# Patient Record
Sex: MALE | Race: WHITE | Employment: UNEMPLOYED | URBAN - NONMETROPOLITAN AREA
[De-identification: names, ages, dates, MRNs, and addresses within clinical notes are randomized per-mention and may not be internally consistent; named-entity substitution may affect disease eponyms.]

---

## 2017-07-02 ENCOUNTER — HOSPITAL ENCOUNTER (EMERGENCY)
Age: 27
Discharge: HOME | End: 2017-07-02
Attending: EMERGENCY MEDICINE
Payer: COMMERCIAL

## 2017-07-02 ENCOUNTER — APPOINTMENT (OUTPATIENT)
Dept: CT IMAGING | Age: 27
End: 2017-07-02
Payer: COMMERCIAL

## 2017-07-02 VITALS
RESPIRATION RATE: 17 BRPM | OXYGEN SATURATION: 96 % | TEMPERATURE: 99 F | HEART RATE: 103 BPM | SYSTOLIC BLOOD PRESSURE: 137 MMHG | DIASTOLIC BLOOD PRESSURE: 96 MMHG

## 2017-07-02 DIAGNOSIS — R10.31 ABDOMINAL PAIN, RIGHT LOWER QUADRANT: Primary | ICD-10-CM

## 2017-07-02 DIAGNOSIS — R19.00 PELVIC MASS IN MALE: ICD-10-CM

## 2017-07-02 LAB
-: NORMAL
ABSOLUTE EOS #: 0.1 K/UL (ref 0–0.4)
ABSOLUTE LYMPH #: 1.8 K/UL (ref 1–4.8)
ABSOLUTE MONO #: 0.7 K/UL (ref 0–1)
ALBUMIN SERPL-MCNC: 4.9 G/DL (ref 3.5–5.2)
ALBUMIN/GLOBULIN RATIO: 1.6 (ref 1–2.5)
ALP BLD-CCNC: 67 U/L (ref 40–129)
ALT SERPL-CCNC: 57 U/L (ref 5–41)
AMORPHOUS: NORMAL
ANION GAP SERPL CALCULATED.3IONS-SCNC: 15 MMOL/L (ref 9–17)
AST SERPL-CCNC: 34 U/L
BACTERIA: NORMAL
BASOPHILS # BLD: 0 %
BASOPHILS ABSOLUTE: 0 K/UL (ref 0–0.2)
BILIRUB SERPL-MCNC: 0.23 MG/DL (ref 0.3–1.2)
BILIRUBIN URINE: NEGATIVE
BUN BLDV-MCNC: 12 MG/DL (ref 6–20)
BUN/CREAT BLD: 18 (ref 9–20)
CALCIUM SERPL-MCNC: 9.6 MG/DL (ref 8.6–10.4)
CASTS UA: NORMAL /LPF
CHLORIDE BLD-SCNC: 100 MMOL/L (ref 98–107)
CO2: 26 MMOL/L (ref 20–31)
COLOR: YELLOW
COMMENT UA: ABNORMAL
CREAT SERPL-MCNC: 0.65 MG/DL (ref 0.7–1.2)
CRYSTALS, UA: NORMAL /HPF
DIFFERENTIAL TYPE: NORMAL
EOSINOPHILS RELATIVE PERCENT: 1 %
EPITHELIAL CELLS UA: NORMAL /HPF (ref 0–5)
GFR AFRICAN AMERICAN: >60 ML/MIN
GFR NON-AFRICAN AMERICAN: >60 ML/MIN
GFR SERPL CREATININE-BSD FRML MDRD: ABNORMAL ML/MIN/{1.73_M2}
GFR SERPL CREATININE-BSD FRML MDRD: ABNORMAL ML/MIN/{1.73_M2}
GLUCOSE BLD-MCNC: 98 MG/DL (ref 70–99)
GLUCOSE URINE: NEGATIVE
HCT VFR BLD CALC: 44.5 % (ref 41–53)
HEMOGLOBIN: 15 G/DL (ref 13.5–17)
KETONES, URINE: NEGATIVE
LEUKOCYTE ESTERASE, URINE: NEGATIVE
LIPASE: 20 U/L (ref 13–60)
LYMPHOCYTES # BLD: 20 %
MCH RBC QN AUTO: 29.4 PG (ref 26–34)
MCHC RBC AUTO-ENTMCNC: 33.7 G/DL (ref 31–37)
MCV RBC AUTO: 87.2 FL (ref 80–100)
MONOCYTES # BLD: 8 %
MUCUS: NORMAL
NITRITE, URINE: NEGATIVE
OTHER OBSERVATIONS UA: NORMAL
PDW BLD-RTO: 14.3 % (ref 12.1–15.2)
PH UA: 6 (ref 5–9)
PLATELET # BLD: 413 K/UL (ref 140–450)
PLATELET ESTIMATE: NORMAL
PMV BLD AUTO: NORMAL FL (ref 6–12)
POTASSIUM SERPL-SCNC: 3.7 MMOL/L (ref 3.7–5.3)
PROTEIN UA: NEGATIVE
RBC # BLD: 5.11 M/UL (ref 4.5–5.9)
RBC # BLD: NORMAL 10*6/UL
RBC UA: NORMAL /HPF (ref 0–2)
RENAL EPITHELIAL, UA: NORMAL /HPF
SEG NEUTROPHILS: 71 %
SEGMENTED NEUTROPHILS ABSOLUTE COUNT: 6.1 K/UL (ref 1.8–7.7)
SODIUM BLD-SCNC: 141 MMOL/L (ref 135–144)
SPECIFIC GRAVITY UA: <1.005 (ref 1.01–1.02)
TOTAL PROTEIN: 8 G/DL (ref 6.4–8.3)
TRICHOMONAS: NORMAL
TURBIDITY: CLEAR
URINE HGB: NEGATIVE
UROBILINOGEN, URINE: NORMAL
WBC # BLD: 8.8 K/UL (ref 3.5–11)
WBC # BLD: NORMAL 10*3/UL
WBC UA: NORMAL /HPF (ref 0–5)
YEAST: NORMAL

## 2017-07-02 PROCEDURE — 74176 CT ABD & PELVIS W/O CONTRAST: CPT

## 2017-07-02 PROCEDURE — 81001 URINALYSIS AUTO W/SCOPE: CPT

## 2017-07-02 PROCEDURE — 83690 ASSAY OF LIPASE: CPT

## 2017-07-02 PROCEDURE — 96375 TX/PRO/DX INJ NEW DRUG ADDON: CPT

## 2017-07-02 PROCEDURE — 85025 COMPLETE CBC W/AUTO DIFF WBC: CPT

## 2017-07-02 PROCEDURE — 96374 THER/PROPH/DIAG INJ IV PUSH: CPT

## 2017-07-02 PROCEDURE — 36415 COLL VENOUS BLD VENIPUNCTURE: CPT

## 2017-07-02 PROCEDURE — 6360000002 HC RX W HCPCS: Performed by: EMERGENCY MEDICINE

## 2017-07-02 PROCEDURE — 80053 COMPREHEN METABOLIC PANEL: CPT

## 2017-07-02 PROCEDURE — 99284 EMERGENCY DEPT VISIT MOD MDM: CPT

## 2017-07-02 PROCEDURE — 2580000003 HC RX 258: Performed by: EMERGENCY MEDICINE

## 2017-07-02 PROCEDURE — 96361 HYDRATE IV INFUSION ADD-ON: CPT

## 2017-07-02 RX ORDER — ONDANSETRON 2 MG/ML
4 INJECTION INTRAMUSCULAR; INTRAVENOUS ONCE
Status: COMPLETED | OUTPATIENT
Start: 2017-07-02 | End: 2017-07-02

## 2017-07-02 RX ORDER — MORPHINE SULFATE 4 MG/ML
4 INJECTION, SOLUTION INTRAMUSCULAR; INTRAVENOUS ONCE
Status: COMPLETED | OUTPATIENT
Start: 2017-07-02 | End: 2017-07-02

## 2017-07-02 RX ADMIN — SODIUM CHLORIDE 999 ML: 9 INJECTION, SOLUTION INTRAVENOUS at 19:59

## 2017-07-02 RX ADMIN — MORPHINE SULFATE 4 MG: 4 INJECTION, SOLUTION INTRAMUSCULAR; INTRAVENOUS at 19:59

## 2017-07-02 RX ADMIN — ONDANSETRON 4 MG: 2 INJECTION INTRAMUSCULAR; INTRAVENOUS at 19:59

## 2017-07-02 ASSESSMENT — PAIN DESCRIPTION - DESCRIPTORS: DESCRIPTORS: CRAMPING

## 2017-07-02 ASSESSMENT — ENCOUNTER SYMPTOMS
ALLERGIC/IMMUNOLOGIC NEGATIVE: 1
RESPIRATORY NEGATIVE: 1
EYES NEGATIVE: 1
GASTROINTESTINAL NEGATIVE: 1

## 2017-07-02 ASSESSMENT — PAIN SCALES - GENERAL
PAINLEVEL_OUTOF10: 4
PAINLEVEL_OUTOF10: 4

## 2017-07-02 ASSESSMENT — PAIN DESCRIPTION - LOCATION: LOCATION: ABDOMEN

## 2017-07-02 ASSESSMENT — PAIN DESCRIPTION - PAIN TYPE: TYPE: ACUTE PAIN

## 2017-07-02 ASSESSMENT — PAIN DESCRIPTION - FREQUENCY: FREQUENCY: INTERMITTENT

## 2017-07-02 ASSESSMENT — PAIN DESCRIPTION - ORIENTATION: ORIENTATION: RIGHT;LOWER

## 2017-08-06 ENCOUNTER — APPOINTMENT (OUTPATIENT)
Dept: GENERAL RADIOLOGY | Age: 27
End: 2017-08-06
Payer: COMMERCIAL

## 2017-08-06 ENCOUNTER — HOSPITAL ENCOUNTER (EMERGENCY)
Age: 27
Discharge: HOME | End: 2017-08-06
Payer: COMMERCIAL

## 2017-08-06 VITALS
RESPIRATION RATE: 18 BRPM | SYSTOLIC BLOOD PRESSURE: 139 MMHG | DIASTOLIC BLOOD PRESSURE: 83 MMHG | OXYGEN SATURATION: 98 % | HEART RATE: 91 BPM | TEMPERATURE: 98.7 F

## 2017-08-06 DIAGNOSIS — S63.502A SPRAIN OF WRIST, LEFT, INITIAL ENCOUNTER: Primary | ICD-10-CM

## 2017-08-06 PROCEDURE — 73130 X-RAY EXAM OF HAND: CPT

## 2017-08-06 PROCEDURE — 73110 X-RAY EXAM OF WRIST: CPT

## 2017-08-06 PROCEDURE — 99283 EMERGENCY DEPT VISIT LOW MDM: CPT

## 2017-08-06 RX ORDER — IBUPROFEN 400 MG/1
400 TABLET ORAL EVERY 6 HOURS PRN
COMMUNITY

## 2017-08-06 RX ORDER — ACETAMINOPHEN 500 MG
1000 TABLET ORAL EVERY 6 HOURS PRN
COMMUNITY

## 2017-08-06 ASSESSMENT — PAIN DESCRIPTION - ORIENTATION: ORIENTATION: LEFT

## 2017-08-06 ASSESSMENT — ENCOUNTER SYMPTOMS
WHEEZING: 0
BACK PAIN: 0
COUGH: 0
NAUSEA: 0
ABDOMINAL PAIN: 0
EYE PAIN: 0
EYE DISCHARGE: 0
SINUS PRESSURE: 0
DIARRHEA: 0
VOMITING: 0
TROUBLE SWALLOWING: 0
RHINORRHEA: 0

## 2017-08-06 ASSESSMENT — PAIN DESCRIPTION - LOCATION: LOCATION: HAND;WRIST

## 2017-08-06 ASSESSMENT — PAIN DESCRIPTION - PAIN TYPE: TYPE: ACUTE PAIN

## 2017-08-06 ASSESSMENT — PAIN SCALES - GENERAL: PAINLEVEL_OUTOF10: 2

## 2017-09-01 ENCOUNTER — APPOINTMENT (OUTPATIENT)
Dept: GENERAL RADIOLOGY | Age: 27
End: 2017-09-01
Payer: COMMERCIAL

## 2017-09-01 ENCOUNTER — HOSPITAL ENCOUNTER (EMERGENCY)
Age: 27
Discharge: HOME | End: 2017-09-01
Payer: COMMERCIAL

## 2017-09-01 VITALS
HEART RATE: 104 BPM | DIASTOLIC BLOOD PRESSURE: 90 MMHG | SYSTOLIC BLOOD PRESSURE: 150 MMHG | RESPIRATION RATE: 18 BRPM | TEMPERATURE: 98.3 F | OXYGEN SATURATION: 98 % | HEIGHT: 69 IN

## 2017-09-01 DIAGNOSIS — S89.92XA LEG INJURY, LEFT, INITIAL ENCOUNTER: Primary | ICD-10-CM

## 2017-09-01 PROCEDURE — 73630 X-RAY EXAM OF FOOT: CPT

## 2017-09-01 PROCEDURE — 99283 EMERGENCY DEPT VISIT LOW MDM: CPT

## 2017-09-01 PROCEDURE — 73610 X-RAY EXAM OF ANKLE: CPT

## 2017-09-01 PROCEDURE — 73590 X-RAY EXAM OF LOWER LEG: CPT

## 2017-09-01 ASSESSMENT — ENCOUNTER SYMPTOMS
CONSTIPATION: 0
VOMITING: 0
ABDOMINAL PAIN: 0
NAUSEA: 0
RHINORRHEA: 0
SHORTNESS OF BREATH: 0
EYE REDNESS: 0
SORE THROAT: 0
WHEEZING: 0
BLOOD IN STOOL: 0
EYE DISCHARGE: 0
CHEST TIGHTNESS: 0
COUGH: 0
DIARRHEA: 0
BACK PAIN: 0

## 2017-09-01 ASSESSMENT — PAIN DESCRIPTION - PAIN TYPE: TYPE: ACUTE PAIN

## 2017-09-01 ASSESSMENT — PAIN SCALES - GENERAL: PAINLEVEL_OUTOF10: 4

## 2017-09-01 ASSESSMENT — PAIN DESCRIPTION - LOCATION: LOCATION: FOOT

## 2017-09-01 ASSESSMENT — PAIN DESCRIPTION - FREQUENCY: FREQUENCY: INTERMITTENT

## 2018-04-19 ENCOUNTER — HOSPITAL ENCOUNTER (EMERGENCY)
Age: 28
Discharge: HOME | End: 2018-04-19
Attending: EMERGENCY MEDICINE
Payer: COMMERCIAL

## 2018-04-19 ENCOUNTER — APPOINTMENT (OUTPATIENT)
Dept: CT IMAGING | Age: 28
End: 2018-04-19
Payer: COMMERCIAL

## 2018-04-19 ENCOUNTER — APPOINTMENT (OUTPATIENT)
Dept: GENERAL RADIOLOGY | Age: 28
End: 2018-04-19
Payer: COMMERCIAL

## 2018-04-19 VITALS
RESPIRATION RATE: 20 BRPM | TEMPERATURE: 98.8 F | BODY MASS INDEX: 21.15 KG/M2 | SYSTOLIC BLOOD PRESSURE: 141 MMHG | OXYGEN SATURATION: 100 % | HEART RATE: 108 BPM | DIASTOLIC BLOOD PRESSURE: 88 MMHG | WEIGHT: 143.2 LBS

## 2018-04-19 DIAGNOSIS — S16.1XXA STRAIN OF NECK MUSCLE, INITIAL ENCOUNTER: ICD-10-CM

## 2018-04-19 DIAGNOSIS — S09.90XA INJURY OF HEAD, INITIAL ENCOUNTER: Primary | ICD-10-CM

## 2018-04-19 PROCEDURE — 71046 X-RAY EXAM CHEST 2 VIEWS: CPT

## 2018-04-19 PROCEDURE — 70450 CT HEAD/BRAIN W/O DYE: CPT

## 2018-04-19 PROCEDURE — 72125 CT NECK SPINE W/O DYE: CPT

## 2018-04-19 PROCEDURE — 99283 EMERGENCY DEPT VISIT LOW MDM: CPT

## 2018-04-19 ASSESSMENT — PAIN DESCRIPTION - ONSET: ONSET: ON-GOING

## 2018-04-19 ASSESSMENT — PAIN DESCRIPTION - FREQUENCY: FREQUENCY: CONTINUOUS

## 2018-04-19 ASSESSMENT — PAIN DESCRIPTION - PROGRESSION: CLINICAL_PROGRESSION: GRADUALLY WORSENING

## 2018-04-19 ASSESSMENT — PAIN DESCRIPTION - DESCRIPTORS: DESCRIPTORS: CONSTANT

## 2018-04-19 ASSESSMENT — PAIN DESCRIPTION - LOCATION: LOCATION: BACK

## 2018-04-19 ASSESSMENT — PAIN SCALES - GENERAL: PAINLEVEL_OUTOF10: 1

## 2018-04-19 ASSESSMENT — PAIN DESCRIPTION - ORIENTATION: ORIENTATION: LEFT;RIGHT;UPPER

## 2018-04-19 ASSESSMENT — PAIN DESCRIPTION - PAIN TYPE: TYPE: ACUTE PAIN

## 2018-05-27 ENCOUNTER — APPOINTMENT (OUTPATIENT)
Dept: GENERAL RADIOLOGY | Age: 28
End: 2018-05-27
Payer: COMMERCIAL

## 2018-05-27 ENCOUNTER — HOSPITAL ENCOUNTER (EMERGENCY)
Age: 28
Discharge: HOME | End: 2018-05-27
Attending: EMERGENCY MEDICINE
Payer: COMMERCIAL

## 2018-05-27 VITALS
WEIGHT: 143 LBS | RESPIRATION RATE: 20 BRPM | DIASTOLIC BLOOD PRESSURE: 82 MMHG | OXYGEN SATURATION: 97 % | HEART RATE: 92 BPM | SYSTOLIC BLOOD PRESSURE: 117 MMHG | HEIGHT: 69 IN | BODY MASS INDEX: 21.18 KG/M2 | TEMPERATURE: 99.2 F

## 2018-05-27 DIAGNOSIS — S70.01XA CONTUSION OF RIGHT HIP, INITIAL ENCOUNTER: Primary | ICD-10-CM

## 2018-05-27 PROCEDURE — 72170 X-RAY EXAM OF PELVIS: CPT

## 2018-05-27 PROCEDURE — 99284 EMERGENCY DEPT VISIT MOD MDM: CPT

## 2018-05-27 PROCEDURE — 73502 X-RAY EXAM HIP UNI 2-3 VIEWS: CPT

## 2018-05-27 PROCEDURE — 6370000000 HC RX 637 (ALT 250 FOR IP): Performed by: EMERGENCY MEDICINE

## 2018-05-27 RX ORDER — DIPHENHYDRAMINE HCL 25 MG
25 CAPSULE ORAL EVERY 6 HOURS PRN
COMMUNITY

## 2018-05-27 RX ORDER — IBUPROFEN 800 MG/1
800 TABLET ORAL ONCE
Status: COMPLETED | OUTPATIENT
Start: 2018-05-27 | End: 2018-05-27

## 2018-05-27 RX ADMIN — IBUPROFEN 800 MG: 800 TABLET ORAL at 14:30

## 2018-05-27 ASSESSMENT — ENCOUNTER SYMPTOMS
DIARRHEA: 0
WHEEZING: 0
SHORTNESS OF BREATH: 0
CHEST TIGHTNESS: 0
ABDOMINAL PAIN: 0
COUGH: 0
SORE THROAT: 0
PHOTOPHOBIA: 0
BLOOD IN STOOL: 0
TROUBLE SWALLOWING: 0
VOICE CHANGE: 0
VOMITING: 0
NAUSEA: 0
FACIAL SWELLING: 0
BACK PAIN: 0

## 2018-05-27 ASSESSMENT — PAIN DESCRIPTION - PAIN TYPE: TYPE: ACUTE PAIN

## 2018-05-27 ASSESSMENT — PAIN SCALES - GENERAL
PAINLEVEL_OUTOF10: 5
PAINLEVEL_OUTOF10: 5

## 2018-05-27 ASSESSMENT — PAIN DESCRIPTION - ORIENTATION: ORIENTATION: RIGHT

## 2018-05-27 ASSESSMENT — PAIN DESCRIPTION - LOCATION: LOCATION: HIP

## 2018-06-06 ENCOUNTER — HOSPITAL ENCOUNTER (EMERGENCY)
Age: 28
Discharge: HOME | End: 2018-06-06
Payer: COMMERCIAL

## 2018-06-06 ENCOUNTER — APPOINTMENT (OUTPATIENT)
Dept: GENERAL RADIOLOGY | Age: 28
End: 2018-06-06
Payer: COMMERCIAL

## 2018-06-06 VITALS
DIASTOLIC BLOOD PRESSURE: 83 MMHG | RESPIRATION RATE: 16 BRPM | OXYGEN SATURATION: 95 % | TEMPERATURE: 97.8 F | SYSTOLIC BLOOD PRESSURE: 124 MMHG | HEART RATE: 91 BPM | WEIGHT: 143 LBS | BODY MASS INDEX: 21.12 KG/M2

## 2018-06-06 DIAGNOSIS — R20.2 TINGLING: Primary | ICD-10-CM

## 2018-06-06 PROCEDURE — 99284 EMERGENCY DEPT VISIT MOD MDM: CPT

## 2018-06-06 PROCEDURE — 73120 X-RAY EXAM OF HAND: CPT

## 2018-06-06 ASSESSMENT — ENCOUNTER SYMPTOMS
RHINORRHEA: 0
CONSTIPATION: 0
BLOOD IN STOOL: 0
COUGH: 0
EYE REDNESS: 0
VOMITING: 0
DIARRHEA: 0
ABDOMINAL PAIN: 0
SHORTNESS OF BREATH: 0
SORE THROAT: 0
NAUSEA: 0
BACK PAIN: 0
WHEEZING: 0
CHEST TIGHTNESS: 0
EYE DISCHARGE: 0

## 2018-11-15 ENCOUNTER — HOSPITAL ENCOUNTER (EMERGENCY)
Age: 28
Discharge: HOME | End: 2018-11-15
Attending: EMERGENCY MEDICINE
Payer: COMMERCIAL

## 2018-11-15 ENCOUNTER — APPOINTMENT (OUTPATIENT)
Dept: GENERAL RADIOLOGY | Age: 28
End: 2018-11-15
Payer: COMMERCIAL

## 2018-11-15 VITALS
OXYGEN SATURATION: 100 % | HEART RATE: 107 BPM | TEMPERATURE: 97.8 F | DIASTOLIC BLOOD PRESSURE: 88 MMHG | RESPIRATION RATE: 20 BRPM | SYSTOLIC BLOOD PRESSURE: 136 MMHG | BODY MASS INDEX: 21.12 KG/M2 | WEIGHT: 143 LBS

## 2018-11-15 DIAGNOSIS — S63.502A SPRAIN OF LEFT WRIST, INITIAL ENCOUNTER: Primary | ICD-10-CM

## 2018-11-15 PROCEDURE — 99283 EMERGENCY DEPT VISIT LOW MDM: CPT

## 2018-11-15 PROCEDURE — 73110 X-RAY EXAM OF WRIST: CPT

## 2018-11-15 NOTE — ED PROVIDER NOTES
eMERGENCY dEPARTMENT eNCOUnter        [address] [facility]    Chief Complaint   Patient presents with    Wrist Injury     fell in bath tub, pain to left wrist        HPI    Saloni Ching is a 33 y.o. male who presents to ED from home. By car. With complaint of L wrist injury. Onset earlier today. Intensity of symptoms moderate. Location of symptoms L wrist.  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       SURGICAL HISTORY    Past Surgical History:   Procedure Laterality Date    LEG AMPUTATION AT HIP         CURRENT MEDICATIONS    Current Outpatient Rx   Medication Sig Dispense Refill    diphenhydrAMINE (BENADRYL) 25 MG capsule Take 25 mg by mouth every 6 hours as needed for Itching      acetaminophen (TYLENOL) 500 MG tablet Take 1,000 mg by mouth every 6 hours as needed for Pain      ibuprofen (ADVIL;MOTRIN) 400 MG tablet Take 400 mg by mouth every 6 hours as needed for Pain         ALLERGIES    Allergies   Allergen Reactions    Pcn [Penicillins] Anaphylaxis    Sulfa Antibiotics     Bactrim [Sulfamethoxazole-Trimethoprim] Nausea And Vomiting       FAMILY HISTORY    No family history on file.     SOCIAL HISTORY    Social History     Social History    Marital status: Single     Spouse name: N/A    Number of children: N/A    Years of education: N/A     Social History Main Topics    Smoking status: Never Smoker    Smokeless tobacco: Never Used    Alcohol use No    Drug use: No    Sexual activity: Not on file     Other Topics Concern    Not on file     Social History Narrative    No narrative on file       PHYSICAL EXAM    VITAL SIGNS: /88   Pulse 107   Temp 97.8 °F (36.6 °C) (Oral)   Resp 20   Wt 143 lb (64.9 kg)   SpO2 100%   BMI 21.12 kg/m²   Constitutional:  Well developed, well nourished,

## 2019-12-21 ENCOUNTER — HOSPITAL ENCOUNTER (EMERGENCY)
Age: 29
Discharge: HOME | End: 2019-12-21
Attending: EMERGENCY MEDICINE
Payer: COMMERCIAL

## 2019-12-21 ENCOUNTER — APPOINTMENT (OUTPATIENT)
Dept: CT IMAGING | Age: 29
End: 2019-12-21
Payer: COMMERCIAL

## 2019-12-21 VITALS
BODY MASS INDEX: 22.15 KG/M2 | SYSTOLIC BLOOD PRESSURE: 116 MMHG | DIASTOLIC BLOOD PRESSURE: 79 MMHG | RESPIRATION RATE: 16 BRPM | HEART RATE: 76 BPM | WEIGHT: 150 LBS | OXYGEN SATURATION: 97 % | TEMPERATURE: 97.8 F

## 2019-12-21 DIAGNOSIS — S00.83XA FACIAL CONTUSION, INITIAL ENCOUNTER: Primary | ICD-10-CM

## 2019-12-21 DIAGNOSIS — S00.81XA FACIAL ABRASION, INITIAL ENCOUNTER: ICD-10-CM
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
## 2019-12-21 PROCEDURE — 99283 EMERGENCY DEPT VISIT LOW MDM: CPT

## 2019-12-21 PROCEDURE — 70450 CT HEAD/BRAIN W/O DYE: CPT

## 2019-12-21 PROCEDURE — 70486 CT MAXILLOFACIAL W/O DYE: CPT

## 2019-12-21 ASSESSMENT — ENCOUNTER SYMPTOMS
COUGH: 0
EYE PAIN: 0
VOMITING: 0
BACK PAIN: 0
PHOTOPHOBIA: 0
NAUSEA: 0
SORE THROAT: 0

## 2019-12-21 ASSESSMENT — PAIN DESCRIPTION - DESCRIPTORS: DESCRIPTORS: ACHING

## 2019-12-21 ASSESSMENT — PAIN SCALES - GENERAL: PAINLEVEL_OUTOF10: 2

## 2019-12-21 ASSESSMENT — PAIN DESCRIPTION - LOCATION: LOCATION: FACE

## 2019-12-21 ASSESSMENT — PAIN DESCRIPTION - ORIENTATION: ORIENTATION: LEFT

## 2019-12-21 ASSESSMENT — PAIN DESCRIPTION - PAIN TYPE: TYPE: ACUTE PAIN

## 2022-12-25 ENCOUNTER — HOSPITAL ENCOUNTER (EMERGENCY)
Age: 32
Discharge: HOME | End: 2022-12-25
Attending: EMERGENCY MEDICINE
Payer: COMMERCIAL

## 2022-12-25 VITALS
SYSTOLIC BLOOD PRESSURE: 145 MMHG | BODY MASS INDEX: 26.36 KG/M2 | RESPIRATION RATE: 18 BRPM | OXYGEN SATURATION: 96 % | DIASTOLIC BLOOD PRESSURE: 97 MMHG | HEART RATE: 101 BPM | HEIGHT: 69 IN | WEIGHT: 178 LBS | TEMPERATURE: 98.5 F

## 2022-12-25 DIAGNOSIS — S61.411A LACERATION OF RIGHT HAND WITHOUT FOREIGN BODY, INITIAL ENCOUNTER: Primary | ICD-10-CM

## 2022-12-25 PROCEDURE — 12002 RPR S/N/AX/GEN/TRNK2.6-7.5CM: CPT

## 2022-12-25 PROCEDURE — 2500000003 HC RX 250 WO HCPCS: Performed by: EMERGENCY MEDICINE

## 2022-12-25 PROCEDURE — 99283 EMERGENCY DEPT VISIT LOW MDM: CPT

## 2022-12-25 RX ORDER — LIDOCAINE HYDROCHLORIDE 10 MG/ML
5 INJECTION, SOLUTION INFILTRATION; PERINEURAL ONCE
Status: COMPLETED | OUTPATIENT
Start: 2022-12-25 | End: 2022-12-25

## 2022-12-25 RX ADMIN — LIDOCAINE HYDROCHLORIDE 5 ML: 10 INJECTION, SOLUTION INFILTRATION; PERINEURAL at 21:04

## 2022-12-25 ASSESSMENT — PAIN DESCRIPTION - LOCATION: LOCATION: HAND

## 2022-12-25 ASSESSMENT — PAIN DESCRIPTION - PAIN TYPE: TYPE: ACUTE PAIN

## 2022-12-25 ASSESSMENT — PAIN DESCRIPTION - ORIENTATION: ORIENTATION: RIGHT

## 2022-12-25 ASSESSMENT — PAIN SCALES - GENERAL: PAINLEVEL_OUTOF10: 1

## 2022-12-25 ASSESSMENT — PAIN - FUNCTIONAL ASSESSMENT: PAIN_FUNCTIONAL_ASSESSMENT: 0-10

## 2022-12-26 NOTE — ED PROVIDER NOTES
eMERGENCY dEPARTMENT eNCOUnter        [address] [facility]    Chief Complaint   Patient presents with    Laceration     States was walking slipped fell onto a glass with his right hand. [facility]    Jeanne Massey is a 33 y.o. male who presents to ED with right hand laceration. Patient states that he was holding a glass lost his balance and fell. Patient presents to ED with 3 cm laceration of the right hand from the broken glass. Patient has previous history of right leg amputation  REVIEW OF SYSTEMS    All systems reviewed and positives are in the [facility]      PAST MEDICAL HISTORY    Past Medical History:   Diagnosis Date    Sarcoma ([address]     Ewings sarcoma       SURGICAL HISTORY    Past Surgical History:   Procedure Laterality Date    LEG AMPUTATION AT HIP         CURRENT MEDICATIONS    Current Outpatient Rx   Medication Sig Dispense Refill    diphenhydrAMINE (BENADRYL) 25 MG capsule Take 25 mg by mouth every 6 hours as needed for Itching      acetaminophen (TYLENOL) 500 MG tablet Take 1,000 mg by mouth every 6 hours as needed for Pain      ibuprofen (ADVIL;MOTRIN) 400 MG tablet Take 400 mg by mouth every 6 hours as needed for Pain         ALLERGIES    Allergies   Allergen Reactions    Pcn [Penicillins] Anaphylaxis    Sulfa Antibiotics     Bactrim [Sulfamethoxazole-Trimethoprim] Nausea And Vomiting       FAMILY HISTORY    History reviewed. No pertinent family history.     SOCIAL HISTORY    Social History     Socioeconomic History    Marital status: Single     Spouse name: None    Number of children: None    Years of education: None    Highest education level: None   Tobacco Use    Smoking status: Never    Smokeless tobacco: Never   Vaping Use    Vaping Use: Never used   Substance and Sexual Activity    Alcohol use: No    Drug use: No       PHYSICAL EXAM    VITAL SIGNS: BP (!) 145/97   Pulse (!) 101   Temp 98.5 °F (36.9 °C) (Oral)   Resp 18   Ht 5' 9\" (1.753 m)   Wt 178 lb (80.7 kg)   SpO2 96%   BMI 26.29 kg/m² Constitutional:  Well developed, well nourished, no acute distress, non-toxic appearance   HENT:  Atraumatic, external ears normal, nose normal, oropharynx moist.  Neck- normal range of motion, no tenderness, supple   Respiratory:  No respiratory distress, normal breath sounds. Cardiovascular:  Normal rate, normal rhythm, no murmurs, no gallops, no rubs   GI:  Soft, nondistended, normal bowel sounds, nontender   Musculoskeletal:   History of right leg amputation. Right hand with 3 cm laceration of the webspace between the first and second fingers. Integument:  Well hydrated, no rash   Neurologic: Alert and oriented x3 no focal deficit. RADIOLOGY/PROCEDURES    No orders to display   Laceration repair:  Patient has 3 cm laceration of the right hand webspace between the thumb and the index finger. The laceration was cleaned with Shur-Clens. 1% lidocaine was used for local anesthesia. #4   Simple interrupted sutures applied with good approximation hemostasis. No foreign body identified dressings applied  Patient has normal flexion, extension abduction and opposition of the thumb    Labs  Labs Reviewed - No data to display          [address] [place] [address] and DIFFERENTIAL DIAGNOSIS/MDM:    Patient Course:   1)  Number and Complexity of Problems  Problem List This Visit:    Problem List Items Addressed This Visit    None  Visit Diagnoses       Laceration of right hand without foreign body, initial encounter    -  Primary            Patient distends to follow-up with primary care provider return for suture removal in 10 days. 2)  Data Reviewed    Imaging that is independently reviewed and interpreted by me as:  Not applicable    See more data below for the lab and radiology tests and orders.     3)  Treatment and Disposition    Shared Decision Making:  Not applicable  ED Medications administered this visit:    Medications   lidocaine 1 % injection 5 mL (5 mLs IntraDERmal Given 12/25/22 7147)       New Prescriptions from this visit:    New Prescriptions    No medications on file       Follow-up:  [facility]  [address] [address]  [phone]    For suture removal        Final Impression:   1.  Laceration of right hand without foreign body, initial encounter               (Please note that portions of this note were completed with a voice recognition program.  Efforts were made to edit the dictations but occasionally words are mis-transcribed.)          Darrel Diane MD  12/25/22 6336

## 2023-01-04 ENCOUNTER — HOSPITAL ENCOUNTER (EMERGENCY)
Age: 33
Discharge: HOME | End: 2023-01-04
Attending: EMERGENCY MEDICINE
Payer: COMMERCIAL

## 2023-01-04 VITALS
OXYGEN SATURATION: 96 % | RESPIRATION RATE: 16 BRPM | DIASTOLIC BLOOD PRESSURE: 91 MMHG | TEMPERATURE: 98 F | HEART RATE: 78 BPM | SYSTOLIC BLOOD PRESSURE: 129 MMHG

## 2023-01-04 DIAGNOSIS — Z48.02 VISIT FOR SUTURE REMOVAL: Primary | ICD-10-CM

## 2023-01-04 PROCEDURE — 99282 EMERGENCY DEPT VISIT SF MDM: CPT

## 2023-01-04 ASSESSMENT — ENCOUNTER SYMPTOMS
NAUSEA: 0
VOMITING: 0

## 2023-01-04 NOTE — ED PROVIDER NOTES
[address] [facility] [address]      Pt Name: Nahun Hernandez  MRN: [id number]  [place] 1990  Date of evaluation: 1/4/2023  Provider: Tino Gonzalez DO    CHIEF COMPLAINT       Chief Complaint   Patient presents with    Suture / Staple Removal     Patient states he has had sutures in his left thumb x10 days and is here for removal.         HISTORY OF PRESENT ILLNESS      Nahun Hernandez is a 33 y.o. male who presents to the emergency department for removal of the right hand. Had 4 sutures placed about 9 days ago in the right thumb. No issues healing. No complaints. REVIEW OF SYSTEMS       Review of Systems   Constitutional:  Negative for fatigue and fever. Gastrointestinal:  Negative for nausea and vomiting. Skin:  Positive for wound. Negative for pallor and rash. Neurological:  Negative for numbness. PAST MEDICAL HISTORY     Past Medical History:   Diagnosis Date    Sarcoma ([address]     Ewings sarcoma         SURGICAL HISTORY       Past Surgical History:   Procedure Laterality Date    LEG AMPUTATION AT HIP           CURRENT MEDICATIONS       Previous Medications    ACETAMINOPHEN (TYLENOL) 500 MG TABLET    Take 1,000 mg by mouth every 6 hours as needed for Pain    DIPHENHYDRAMINE (BENADRYL) 25 MG CAPSULE    Take 25 mg by mouth every 6 hours as needed for Itching    IBUPROFEN (ADVIL;MOTRIN) 400 MG TABLET    Take 400 mg by mouth every 6 hours as needed for Pain       ALLERGIES       Pcn [penicillins], Sulfa antibiotics, and Bactrim [sulfamethoxazole-trimethoprim]    FAMILY HISTORY       History reviewed. No pertinent family history.        SOCIAL HISTORY       Social History     Tobacco Use    Smoking status: Never    Smokeless tobacco: Never   Vaping Use    Vaping Use: Never used   Substance Use Topics    Alcohol use: No    Drug use: No         PHYSICAL EXAM       ED Triage Vitals   BP Temp Temp src Pulse Resp SpO2 Height Weight   -- -- -- -- -- -- -- --       Physical Exam  Constitutional:       General: He is not in acute distress. Appearance: Normal appearance. He is not ill-appearing, toxic-appearing or diaphoretic. Cardiovascular:      Pulses: Normal pulses. Pulmonary:      Effort: Pulmonary effort is normal.   Musculoskeletal:      Comments:   Wound to the base of the right thumb on the volar aspect. 4 sutures in place. Full range of motion of the hand   Skin:     Capillary Refill: Capillary refill takes less than 2 seconds. Comments: Laceration to base of right thumb on the volar aspect. 4 sutures in place. No drainage or bleeding. No sign of infection   Neurological:      Mental Status: He is alert and oriented to person, place, and time. Sensory: No sensory deficit. Psychiatric:         Mood and Affect: Mood normal.         Behavior: Behavior normal.       DIAGNOSTIC RESULTS       RADIOLOGY:         Interpretation per the Radiologist below, if available at the time of this note:    No orders to display         LABS:  Labs Reviewed - No data to display    All other labs were within normal range or not returned as of this dictation. MIPS    Not applicable      EMERGENCY DEPARTMENT COURSE and DIFFERENTIAL DIAGNOSIS/MDM:   Presented for suture removal.  He had 4 sutures placed 9 days ago. They were removed without complication. Advised to keep washing it with soap and water. No signs of infection. Discharged home in stable condition. 1)  Number and Complexity of Problems  Problem List This Visit:    Problem List Items Addressed This Visit    None  Visit Diagnoses       Visit for suture removal    -  Primary            Differential Diagnosis: suture removal       2)  Data Reviewed    Imaging that is independently reviewed and interpreted by me as:  Not applicable    See more data below for the lab and radiology tests and orders.     3)  Treatment and Disposition    Shared Decision Making:  Not applicable      REASSESSMENT PROCEDURES:  Unless otherwise noted below, none     Procedures    FINAL IMPRESSION      1.  Visit for suture removal          DISPOSITION/PLAN     DISPOSITION Decision To Discharge 01/04/2023 09:53:45 AM      PATIENT REFERRED TO:  your family doctor as needed      As needed      DISCHARGE MEDICATIONS:  New Prescriptions    No medications on file       (Please note that portions of this note were completed with a voice recognition program.  Efforts were made to edit the dictations but occasionally words are mis-transcribed.)    Kristin Dominguez DO (electronically signed)  Attending Emergency Physician                      Kristin Dominguez DO  01/04/23 0042

## 2025-04-17 ENCOUNTER — APPOINTMENT (OUTPATIENT)
Dept: CT IMAGING | Age: 35
End: 2025-04-17
Payer: COMMERCIAL

## 2025-04-17 ENCOUNTER — HOSPITAL ENCOUNTER (EMERGENCY)
Age: 35
Discharge: HOME | End: 2025-04-17
Payer: COMMERCIAL

## 2025-04-17 VITALS
HEART RATE: 100 BPM | TEMPERATURE: 97.8 F | OXYGEN SATURATION: 94 % | SYSTOLIC BLOOD PRESSURE: 148 MMHG | RESPIRATION RATE: 16 BRPM | DIASTOLIC BLOOD PRESSURE: 99 MMHG

## 2025-04-17 DIAGNOSIS — S09.90XA INJURY OF HEAD, INITIAL ENCOUNTER: Primary | ICD-10-CM
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
## 2025-04-17 LAB
ALBUMIN SERPL-MCNC: 4.4 G/DL (ref 3.5–5.2)
ALBUMIN/GLOB SERPL: 1.6 {RATIO} (ref 1–2.5)
ALP SERPL-CCNC: 81 U/L (ref 40–129)
ALT SERPL-CCNC: 19 U/L (ref 10–50)
ANION GAP SERPL CALCULATED.3IONS-SCNC: 11 MMOL/L (ref 9–16)
AST SERPL-CCNC: 18 U/L (ref 10–50)
BASOPHILS # BLD: 0.1 K/UL (ref 0–0.2)
BASOPHILS NFR BLD: 1 % (ref 0–2)
BILIRUB SERPL-MCNC: <0.2 MG/DL (ref 0–1.2)
BUN SERPL-MCNC: 15 MG/DL (ref 6–20)
BUN/CREAT SERPL: 21 (ref 9–20)
CALCIUM SERPL-MCNC: 9.5 MG/DL (ref 8.6–10.4)
CHLORIDE SERPL-SCNC: 103 MMOL/L (ref 98–107)
CO2 SERPL-SCNC: 25 MMOL/L (ref 20–31)
CREAT SERPL-MCNC: 0.7 MG/DL (ref 0.7–1.2)
EOSINOPHIL # BLD: 0.22 K/UL (ref 0–0.44)
EOSINOPHILS RELATIVE PERCENT: 3 % (ref 1–4)
ERYTHROCYTE [DISTWIDTH] IN BLOOD BY AUTOMATED COUNT: 14.2 % (ref 11.8–14.4)
GFR, ESTIMATED: >90 ML/MIN/1.73M2
GLUCOSE SERPL-MCNC: 83 MG/DL (ref 74–99)
HCT VFR BLD AUTO: 46.4 % (ref 40.7–50.3)
HGB BLD-MCNC: 15.6 G/DL (ref 13–17)
IMM GRANULOCYTES # BLD AUTO: 0.03 K/UL (ref 0–0.3)
IMM GRANULOCYTES NFR BLD: 0 %
INR PPP: 1.2
LYMPHOCYTES NFR BLD: 2.21 K/UL (ref 1.1–3.7)
LYMPHOCYTES RELATIVE PERCENT: 25 % (ref 24–43)
MCH RBC QN AUTO: 29.2 PG (ref 25.2–33.5)
MCHC RBC AUTO-ENTMCNC: 33.6 G/DL (ref 28.4–34.8)
MCV RBC AUTO: 86.9 FL (ref 82.6–102.9)
MONOCYTES NFR BLD: 0.85 K/UL (ref 0.1–1.2)
MONOCYTES NFR BLD: 10 % (ref 3–12)
NEUTROPHILS NFR BLD: 61 % (ref 36–65)
NEUTS SEG NFR BLD: 5.47 K/UL (ref 1.5–8.1)
NRBC BLD-RTO: 0 PER 100 WBC
PLATELET # BLD AUTO: 503 K/UL (ref 138–453)
PMV BLD AUTO: 8.4 FL (ref 8.1–13.5)
POTASSIUM SERPL-SCNC: 4.3 MMOL/L (ref 3.7–5.3)
PROT SERPL-MCNC: 7.1 G/DL (ref 6.6–8.7)
PROTHROMBIN TIME: 14.3 SEC (ref 11.7–14.1)
RBC # BLD AUTO: 5.34 M/UL (ref 4.21–5.77)
SODIUM SERPL-SCNC: 139 MMOL/L (ref 136–145)
WBC OTHER # BLD: 8.9 K/UL (ref 3.5–11.3)

## 2025-04-17 PROCEDURE — 70450 CT HEAD/BRAIN W/O DYE: CPT

## 2025-04-17 PROCEDURE — 85610 PROTHROMBIN TIME: CPT

## 2025-04-17 PROCEDURE — 85025 COMPLETE CBC W/AUTO DIFF WBC: CPT

## 2025-04-17 PROCEDURE — 72125 CT NECK SPINE W/O DYE: CPT

## 2025-04-17 PROCEDURE — 99284 EMERGENCY DEPT VISIT MOD MDM: CPT

## 2025-04-17 PROCEDURE — 80053 COMPREHEN METABOLIC PANEL: CPT

## 2025-04-17 RX ORDER — 0.9 % SODIUM CHLORIDE 0.9 %
500 INTRAVENOUS SOLUTION INTRAVENOUS ONCE
Status: DISCONTINUED | OUTPATIENT
Start: 2025-04-17 | End: 2025-04-17

## 2025-04-17 ASSESSMENT — ENCOUNTER SYMPTOMS
ABDOMINAL PAIN: 0
APNEA: 0
NAUSEA: 0
ANAL BLEEDING: 0
VOMITING: 0
PHOTOPHOBIA: 0
ABDOMINAL DISTENTION: 0

## 2025-04-17 ASSESSMENT — PAIN SCALES - GENERAL: PAINLEVEL_OUTOF10: 1

## 2025-04-17 NOTE — ED PROVIDER NOTES
[facility] EMERGENCY DEPARTMENT  EMERGENCY DEPARTMENT ENCOUNTER      Pt Name: Gaby Alonso  MRN: [id number]  Birthdate 1990  Date of evaluation: 4/17/2025  Provider: Israel Emery PA-C  2:20 PM EDT    CHIEF COMPLAINT       Chief Complaint   Patient presents with    Fall     Patient's crutches slipped and patient fell, hitting head on table. Unknown LOC but patient is feeling \"fuzzy headed\".  Denies CP/SOB.         HISTORY OF PRESENT ILLNESS   (Location/Symptom, Timing/Onset, Context/Setting, Quality, Duration, Modifying Factors, Severity)  Note limiting factors.   Gaby Alonso is a 34 y.o. male who presents to the emergency department patient states was walking with crutches his crutch slipped he fell hit his head on table left-sided feels \"fuzzy headed\" since then he does indicate headache and pain left side of head to below area states he had single small amount of bleeding from nose.  Patient denies any loss of consciousness ,blurred vision, chest pain, abdominal pain, nausea, vomiting.  Patient states he may have taken a Motrin yesterday for pain but does not take blood thinners including aspirin Eliquis or Xarelto.  Denies any urinary bleeding / rectal bleeding.  Symptoms mild to moderate severity.  Nothing improves or worsen symptoms    HPI    Nursing Notes were reviewed.    REVIEW OF SYSTEMS    (2-9 systems for level 4, 10 or more for level 5)     Review of Systems   Constitutional:  Negative for activity change, chills, fatigue and fever.   HENT:  Positive for nosebleeds. Negative for ear discharge and ear pain.    Eyes:  Negative for photophobia.   Respiratory:  Negative for apnea.    Cardiovascular:  Negative for chest pain.   Gastrointestinal:  Negative for abdominal distention, abdominal pain, anal bleeding, nausea and vomiting.   Genitourinary:  Negative for dysuria and hematuria.   Musculoskeletal:  Negative for joint swelling.   Skin:  Negative for pallor.   Neurological:  Positive for

## 2025-04-17 NOTE — DISCHARGE INSTRUCTIONS
Follow-up with primary care physician you may select your own or select from list provided.  Drink plenty of fluids take Tylenol as directed on packaging for pain or discomfort.  Return to ER if any symptoms worsen or new symptoms develop.  
None